# Patient Record
Sex: MALE | Race: BLACK OR AFRICAN AMERICAN | NOT HISPANIC OR LATINO | Employment: STUDENT | URBAN - METROPOLITAN AREA
[De-identification: names, ages, dates, MRNs, and addresses within clinical notes are randomized per-mention and may not be internally consistent; named-entity substitution may affect disease eponyms.]

---
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
## 2024-02-18 ENCOUNTER — AMB VIDEO VISIT (OUTPATIENT)
Dept: OTHER | Facility: HOSPITAL | Age: 24
End: 2024-02-18

## 2024-02-18 VITALS — RESPIRATION RATE: 16 BRPM

## 2024-02-18 DIAGNOSIS — F32.A DEPRESSION, UNSPECIFIED DEPRESSION TYPE: ICD-10-CM

## 2024-02-18 DIAGNOSIS — R41.840 LACK OF CONCENTRATION: Primary | ICD-10-CM

## 2024-02-18 PROCEDURE — ECARE PR SL URGENT CARE VIRTUAL VISIT: Performed by: NURSE PRACTITIONER

## 2024-02-19 ENCOUNTER — AMB VIDEO VISIT (OUTPATIENT)
Dept: OTHER | Facility: HOSPITAL | Age: 24
End: 2024-02-19

## 2024-02-19 NOTE — PROGRESS NOTES
Required Documentation:  Encounter provider MARILYN Roche    Provider located at [facility]  VIRTUAL CARE   [address] [id number]    Identify all parties in room with patient during virtual visit:  No one else    The patient was identified by name and date of birth. Seferino Mckenzie was informed that this is a telemedicine visit and that the visit is being conducted through the [employer] [facility] platform. He agrees to proceed..  My office door was closed. No one else was in the room.  He acknowledged consent and understanding of privacy and security of the video platform. The patient has agreed to participate and understands they can discontinue the visit at any time.    Verification of patient location:    Patient is located at Home in the following state in which I hold an active license [place]    Patient is aware this is a billable service.     Reason for visit is No chief complaint on file.       Subjective  This is a 23 year old male here today for video visit.  He states he has not been able to focusing as well as he normally would. He states he has seen a gradual change over the last year.   He has been deviated from his main task the needs to do.  He states he knows he has a deadline but will wait until the last minute.  He was able to do more things and able to remember thing.  He states he his having a hard time to focus.  He has been trying to to deadlines but he can not meet them. He states he is feeling a little down right now.  He states he just got out of a relations.  Decreased appetite.  He states he lost 20 pounds in December but then gained the weight back.  He denies any thoughts of self harm or harming anyone else.  He states he has been using melatonin gummies to help with sleep.   He is getting about 5 hours a sleep.  He is going to school at [employer].   He finds himself daydreaming.  He states he did  have some depression when he was in high school.  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    No past surgical history on file.     No Known Allergies    Review of Systems   Constitutional:  Negative for activity change, chills and fatigue.   Respiratory: Negative.     Cardiovascular: Negative.    Neurological: Negative.    Hematological: Negative.    Psychiatric/Behavioral:  Positive for decreased concentration and dysphoric mood. Negative for confusion, self-injury and suicidal ideas. The patient is not nervous/anxious.        Video Exam    Vitals:    02/18/24 2208   Resp: 16       Physical Exam  Constitutional:       General: He is not in acute distress.     Appearance: Normal appearance. He is not ill-appearing or toxic-appearing.   Neurological:      Mental Status: He is alert.   Psychiatric:         Attention and Perception: Attention and perception normal. He is attentive.         Mood and Affect: Affect is flat. Affect is not blunt or tearful.         Behavior: Behavior is slowed. Behavior is cooperative.         Thought Content: Thought content is not paranoid or delusional. Thought content does not include homicidal or suicidal ideation. Thought content does not include homicidal or suicidal plan.         Judgment: Judgment is not impulsive.         Visit Time  Total Visit Duration: 20 minutes    Assessment/Plan:    Diagnoses and all orders for this visit:    Lack of concentration  -     Ambulatory referral to Psych Services; Future    Depression, unspecified depression type  -     Ambulatory referral to Psych Services; Future        Patient Instructions   As we discussed you should see a mental health professional to figure out what is causing your symptoms.  I placed a referral through [facility].  You may also want to go to your school health center and see what resources are available to your.  If you develop any thoughts of self harm or  harming anyone else go directly to ER.

## 2024-02-19 NOTE — CARE ANYWHERE EVISITS
Visit Summary for Seferino Mckenzie - Gender: Male - Date of Birth: 2000  Date: [id number] - Duration: 20 minutes  Patient: Seferino Mckenzie  Provider: Marcela SANDERS    Patient Contact Information  Address  [address] [place] [address]; [place] [address]  [phone]    Visit Topics  I had a noticeable decrease in my memory and focus in the past year [Added By: Self - 2024-02-19]    Triage Questions   What is your current physical address in the event of a medical emergency? Answer []  Are you allergic to any medications? Answer []  Are you now or could you be pregnant? Answer []  Do you have any immune system compromise or chronic lung   disease? Answer []  Do you have any vulnerable family members in the home (infant, pregnant, cancer, elderly)? Answer []     Conversation Transcripts  [0A][0A] [Notification] You are connected with Marcela SANDERS, Urgent Care Specialist.[0A][Notification] Seferino Mckenzie is located in [place].[0A][Notification] Seferino Mckenzie has shared health history...[0A]    Diagnosis    Procedures  Value: 14418 Code: CPT-4 UNLISTED E&M SERVICE    Medications Prescribed    No prescriptions ordered    Electronically signed by: Marcela Shafer(NPI [id number])

## 2024-02-19 NOTE — PATIENT INSTRUCTIONS
As we discussed you should see a mental health professional to figure out what is causing your symptoms.  [facility].  You may also want to go to your school health center and see what resources are available to your.  If you develop any thoughts of self harm or harming anyone else go directly to ER.

## 2024-03-05 ENCOUNTER — TELEPHONE (OUTPATIENT)
Dept: PSYCHIATRY | Facility: CLINIC | Age: 24
End: 2024-03-05

## 2024-06-02 ENCOUNTER — APPOINTMENT (EMERGENCY)
Dept: RADIOLOGY | Facility: HOSPITAL | Age: 24
End: 2024-06-02
Payer: COMMERCIAL

## 2024-06-02 ENCOUNTER — HOSPITAL ENCOUNTER (EMERGENCY)
Facility: HOSPITAL | Age: 24
Discharge: HOME | End: 2024-06-02
Attending: EMERGENCY MEDICINE | Admitting: EMERGENCY MEDICINE
Payer: COMMERCIAL

## 2024-06-02 VITALS
RESPIRATION RATE: 16 BRPM | WEIGHT: 155 LBS | OXYGEN SATURATION: 99 % | HEART RATE: 95 BPM | BODY MASS INDEX: 22.19 KG/M2 | HEIGHT: 70 IN | DIASTOLIC BLOOD PRESSURE: 84 MMHG | SYSTOLIC BLOOD PRESSURE: 133 MMHG | TEMPERATURE: 99.6 F

## 2024-06-02 DIAGNOSIS — R10.9 ABDOMINAL PAIN, UNSPECIFIED ABDOMINAL LOCATION: Primary | ICD-10-CM

## 2024-06-02 DIAGNOSIS — R19.7 DIARRHEA, UNSPECIFIED TYPE: ICD-10-CM

## 2024-06-02 LAB
ALBUMIN SERPL-MCNC: 4.6 G/DL (ref 3.5–5.7)
ALP SERPL-CCNC: 76 IU/L (ref 34–125)
ALT SERPL-CCNC: 29 IU/L (ref 7–52)
ANION GAP SERPL CALC-SCNC: 7 MEQ/L (ref 3–15)
ANISOCYTOSIS BLD QL SMEAR: ABNORMAL
AST SERPL-CCNC: 30 IU/L (ref 13–39)
BACTERIA URNS QL MICRO: ABNORMAL /HPF
BASOPHILS # BLD: 0.06 K/UL (ref 0.01–0.1)
BASOPHILS NFR BLD: 1 %
BILIRUB SERPL-MCNC: 0.6 MG/DL (ref 0.3–1.2)
BILIRUB UR QL STRIP.AUTO: NEGATIVE MG/DL
BUN SERPL-MCNC: 15 MG/DL (ref 7–25)
CALCIUM SERPL-MCNC: 9.5 MG/DL (ref 8.6–10.3)
CHLORIDE SERPL-SCNC: 100 MEQ/L (ref 98–107)
CLARITY UR REFRACT.AUTO: CLEAR
CO2 SERPL-SCNC: 28 MEQ/L (ref 21–31)
COLOR UR AUTO: YELLOW
CREAT SERPL-MCNC: 1.1 MG/DL (ref 0.7–1.3)
DIFFERENTIAL METHOD BLD: ABNORMAL
EGFRCR SERPLBLD CKD-EPI 2021: >60 ML/MIN/1.73M*2
EOSINOPHIL # BLD: 0 K/UL (ref 0.04–0.54)
EOSINOPHIL NFR BLD: 0 %
ERYTHROCYTE [DISTWIDTH] IN BLOOD BY AUTOMATED COUNT: 11.9 % (ref 11.6–14.4)
FLUAV RNA SPEC QL NAA+PROBE: NEGATIVE
FLUBV RNA SPEC QL NAA+PROBE: NEGATIVE
GLUCOSE SERPL-MCNC: 107 MG/DL (ref 70–99)
GLUCOSE UR STRIP.AUTO-MCNC: NEGATIVE MG/DL
HCT VFR BLD AUTO: 43.8 % (ref 40.1–51)
HGB BLD-MCNC: 15 G/DL (ref 13.7–17.5)
HGB UR QL STRIP.AUTO: NEGATIVE
HYALINE CASTS #/AREA URNS LPF: ABNORMAL /LPF
KETONES UR STRIP.AUTO-MCNC: ABNORMAL MG/DL
LEUKOCYTE ESTERASE UR QL STRIP.AUTO: NEGATIVE
LYMPHOCYTES # BLD: 1.2 K/UL (ref 1.2–3.5)
LYMPHOCYTES NFR BLD: 19 %
MCH RBC QN AUTO: 28.4 PG (ref 28–33.2)
MCHC RBC AUTO-ENTMCNC: 34.2 G/DL (ref 32.2–36.5)
MCV RBC AUTO: 83 FL (ref 83–98)
METAMYELOCYTES # BLD MANUAL: 0.06 K/UL
METAMYELOCYTES NFR BLD MANUAL: 1 %
MONOCYTES # BLD: 0.38 K/UL (ref 0.3–1)
MONOCYTES NFR BLD: 6 %
MUCOUS THREADS URNS QL MICRO: 1 /LPF
NEUTS BAND # BLD: 0.31 K/UL (ref 0–0.53)
NEUTS BAND # BLD: 4.21 K/UL (ref 1.7–7)
NEUTS BAND NFR BLD: 5 %
NEUTS SEG NFR BLD: 67 %
NITRITE UR QL STRIP.AUTO: NEGATIVE
OVALOCYTES BLD QL SMEAR: ABNORMAL
PDW BLD AUTO: 10.2 FL (ref 9.4–12.4)
PH UR STRIP.AUTO: 6 [PH]
PLATELET # BLD AUTO: 177 K/UL (ref 150–350)
PLATELET # BLD EST: ABNORMAL 10*3/UL
PLATELET CLUMP BLD QL SMEAR: PRESENT
POTASSIUM SERPL-SCNC: 4 MEQ/L (ref 3.5–5.1)
PROT SERPL-MCNC: 8.1 G/DL (ref 6–8.2)
PROT UR QL STRIP.AUTO: 1
RBC # BLD AUTO: 5.28 M/UL (ref 4.5–5.8)
RBC #/AREA URNS HPF: ABNORMAL /HPF
RSV RNA SPEC QL NAA+PROBE: NEGATIVE
SARS-COV-2 RNA RESP QL NAA+PROBE: NEGATIVE
SCHISTOCYTES BLD QL SMEAR: ABNORMAL
SODIUM SERPL-SCNC: 135 MEQ/L (ref 136–145)
SP GR UR REFRACT.AUTO: >1.035
SQUAMOUS URNS QL MICRO: ABNORMAL /HPF
UROBILINOGEN UR STRIP-ACNC: 0.2 EU/DL
VARIANT LYMPHS # BLD MANUAL: 0.06 K/UL
VARIANT LYMPHS NFR BLD: 1 %
WBC # BLD AUTO: 6.29 K/UL (ref 3.8–10.5)
WBC #/AREA URNS HPF: ABNORMAL /HPF

## 2024-06-02 PROCEDURE — 81003 URINALYSIS AUTO W/O SCOPE: CPT

## 2024-06-02 PROCEDURE — 96361 HYDRATE IV INFUSION ADD-ON: CPT

## 2024-06-02 PROCEDURE — 25800000 HC PHARMACY IV SOLUTIONS: Performed by: PHYSICIAN ASSISTANT

## 2024-06-02 PROCEDURE — 85025 COMPLETE CBC W/AUTO DIFF WBC: CPT | Performed by: EMERGENCY MEDICINE

## 2024-06-02 PROCEDURE — 3E033GC INTRODUCTION OF OTHER THERAPEUTIC SUBSTANCE INTO PERIPHERAL VEIN, PERCUTANEOUS APPROACH: ICD-10-PCS | Performed by: EMERGENCY MEDICINE

## 2024-06-02 PROCEDURE — 63600000 HC DRUGS/DETAIL CODE: Mod: JZ | Performed by: PHYSICIAN ASSISTANT

## 2024-06-02 PROCEDURE — 74177 CT ABD & PELVIS W/CONTRAST: CPT

## 2024-06-02 PROCEDURE — 63600105 HC IODINE BASED CONTRAST: Mod: JZ | Performed by: PHYSICIAN ASSISTANT

## 2024-06-02 PROCEDURE — 3E023GC INTRODUCTION OF OTHER THERAPEUTIC SUBSTANCE INTO MUSCLE, PERCUTANEOUS APPROACH: ICD-10-PCS | Performed by: EMERGENCY MEDICINE

## 2024-06-02 PROCEDURE — 96372 THER/PROPH/DIAG INJ SC/IM: CPT | Mod: 59

## 2024-06-02 PROCEDURE — 99284 EMERGENCY DEPT VISIT MOD MDM: CPT | Mod: 25

## 2024-06-02 PROCEDURE — 81001 URINALYSIS AUTO W/SCOPE: CPT | Performed by: EMERGENCY MEDICINE

## 2024-06-02 PROCEDURE — 80053 COMPREHEN METABOLIC PANEL: CPT | Performed by: EMERGENCY MEDICINE

## 2024-06-02 PROCEDURE — 96374 THER/PROPH/DIAG INJ IV PUSH: CPT | Mod: 59

## 2024-06-02 PROCEDURE — 36415 COLL VENOUS BLD VENIPUNCTURE: CPT

## 2024-06-02 PROCEDURE — 3E0337Z INTRODUCTION OF ELECTROLYTIC AND WATER BALANCE SUBSTANCE INTO PERIPHERAL VEIN, PERCUTANEOUS APPROACH: ICD-10-PCS | Performed by: EMERGENCY MEDICINE

## 2024-06-02 PROCEDURE — 87637 SARSCOV2&INF A&B&RSV AMP PRB: CPT | Performed by: EMERGENCY MEDICINE

## 2024-06-02 RX ORDER — IOPAMIDOL 755 MG/ML
100 INJECTION, SOLUTION INTRAVASCULAR
Status: COMPLETED | OUTPATIENT
Start: 2024-06-02 | End: 2024-06-02

## 2024-06-02 RX ORDER — DICYCLOMINE HYDROCHLORIDE 10 MG/ML
20 INJECTION INTRAMUSCULAR ONCE
Status: COMPLETED | OUTPATIENT
Start: 2024-06-02 | End: 2024-06-02

## 2024-06-02 RX ORDER — ONDANSETRON HYDROCHLORIDE 2 MG/ML
4 INJECTION, SOLUTION INTRAVENOUS ONCE
Status: COMPLETED | OUTPATIENT
Start: 2024-06-02 | End: 2024-06-02

## 2024-06-02 RX ADMIN — SODIUM CHLORIDE 1000 ML: 9 INJECTION, SOLUTION INTRAVENOUS at 10:31

## 2024-06-02 RX ADMIN — ONDANSETRON 4 MG: 2 INJECTION INTRAMUSCULAR; INTRAVENOUS at 10:33

## 2024-06-02 RX ADMIN — DICYCLOMINE HYDROCHLORIDE 20 MG: 10 INJECTION, SOLUTION INTRAMUSCULAR at 10:33

## 2024-06-02 RX ADMIN — IOPAMIDOL 100 ML: 755 INJECTION, SOLUTION INTRAVENOUS at 12:00

## 2024-06-02 ASSESSMENT — ENCOUNTER SYMPTOMS
NUMBNESS: 1
FEVER: 0
HEMATURIA: 0
COUGH: 0
DIFFICULTY URINATING: 0
DIARRHEA: 1
DYSURIA: 0
VOMITING: 0
DIZZINESS: 0
CONSTIPATION: 1
CHILLS: 1
ABDOMINAL PAIN: 1
NAUSEA: 0
FREQUENCY: 0
SHORTNESS OF BREATH: 0
WOUND: 0
FLANK PAIN: 0
BACK PAIN: 0

## 2024-06-02 NOTE — ED PROVIDER NOTES
"Emergency Medicine Note  HPI   HISTORY OF PRESENT ILLNESS         22 y/o male presents for evaluation of abdominal pain and \"bowel inconsistencies\" x 2 weeks. Patient was in [facility] when symptoms started. States while traveling, he developed LUQ and LLQ pain described as cramping as well as 1-2 episodes of diarrhea per day. States he had 3 days of constipation from 5/20-5/22 but symptoms then returned. States over past couple days, he has chills, tingling of both legs, and a headache. Did not take anything for symptomatic relief. No known sick contacts. No fevers, chills, CP, SOB, vomiting, urinary complaints, bloody stools, dizziness, or syncope.          Patient History   PAST HISTORY     Reviewed from Nursing Triage:       History reviewed. No pertinent past medical history.    History reviewed. No pertinent surgical history.    Family History   Problem Relation Age of Onset    Diabetes Biological Father     No Known Problems Biological Mother     Hypertension Maternal Grandmother     Hypertension Maternal Grandfather     Dementia Paternal Grandfather        Social History     Tobacco Use    Smoking status: Never    Smokeless tobacco: Never   Substance Use Topics    Alcohol use: Never    Drug use: Never         Review of Systems   REVIEW OF SYSTEMS     Review of Systems   Constitutional:  Positive for chills. Negative for fever.   Respiratory:  Negative for cough and shortness of breath.    Cardiovascular:  Negative for chest pain.   Gastrointestinal:  Positive for abdominal pain, constipation and diarrhea. Negative for nausea and vomiting.   Genitourinary:  Negative for difficulty urinating, dysuria, flank pain, frequency and hematuria.   Musculoskeletal:  Negative for back pain.   Skin:  Negative for rash and wound.   Neurological:  Positive for numbness. Negative for dizziness and syncope.         VITALS     ED Vitals      Date/Time Temp Pulse Resp BP SpO2 [facility]   06/02/24 0941 37.6 °C (99.6 °F) 95 16 133/84 " 99 % JRT          Pulse Ox %: 99 % (06/02/24 1114)  Pulse Ox Interpretation: Normal (06/02/24 1114)           Physical Exam   PHYSICAL EXAM     Physical Exam  Vitals and nursing note reviewed.   Constitutional:       Appearance: He is well-developed.   HENT:      Head: Normocephalic.      Nose: Nose normal.   Eyes:      Conjunctiva/sclera: Conjunctivae normal.   Cardiovascular:      Rate and Rhythm: Normal rate and regular rhythm.   Pulmonary:      Effort: Pulmonary effort is normal.      Breath sounds: Normal breath sounds.   Abdominal:      General: Bowel sounds are normal.      Palpations: Abdomen is soft.      Tenderness: There is no guarding or rebound.      Comments: LLQ and suprapubic tenderness   Musculoskeletal:      Cervical back: Neck supple.   Skin:     General: Skin is warm and dry.   Neurological:      General: No focal deficit present.      Mental Status: He is alert.   Psychiatric:         Mood and Affect: Mood normal.           PROCEDURES     Procedures     DATA     Results       Procedure Component Value Units Date/Time    CBC and differential [[id number]]  (Abnormal) Collected: 06/02/24 0952    Specimen: Blood, Venous Updated: 06/02/24 1118     WBC 6.29 K/uL      RBC 5.28 M/uL      Hemoglobin 15.0 g/dL      Hematocrit 43.8 %      MCV 83.0 fL      MCH 28.4 pg      MCHC 34.2 g/dL      RDW 11.9 %      Platelets 177 K/uL      MPV 10.2 fL      Differential Type Manu     Neutrophils 67 %      Lymphocytes 19 %      Monocytes 6 %      Eosinophils 0 %      Basophils 1 %      Bands 5 %      Metamyelocytes 1 %      Atypical Lymphocytes 1 %      Neutrophils, Absolute 4.21 K/uL      Lymphocytes, Absolute 1.20 K/uL      Monocytes, Absolute 0.38 K/uL      Eosinophils, Absolute 0.00 K/uL      Basophils, Absolute 0.06 K/uL      Bands, Absolute 0.31 K/uL      Metamyelocytes, Absolute 0.06 K/uL      Atypical Lymphs, Absolute 0.06 K/uL      Platelet Estimate Adequate (150,000-400,000)     Clumped Platelets Present      Anisocytosis 1+     Ovalocytes Occasional     Schistocytes Occasional    SARS-COV-2 (COVID-19)/ FLU A/B, AND RSV, PCR Nasopharynx [[id number]]  (Normal) Collected: 06/02/24 0946    Specimen: Nasopharyngeal Swab from Nasopharynx Updated: 06/02/24 1050     SARS-CoV-2 (COVID-19) Negative     Influenza A Negative     Influenza B Negative     Respiratory Syncytial Virus Negative    Narrative:      Testing performed using real-time PCR for detection of COVID-19. EUA approved validation studies performed on site.     Comprehensive metabolic panel [[id number]]  (Abnormal) Collected: 06/02/24 0952    Specimen: Blood, Venous Updated: 06/02/24 1027     Sodium 135 mEQ/L      Potassium 4.0 mEQ/L      Comment: Results obtained on plasma. Plasma Potassium values may be up to 0.4 mEQ/L less than serum values. The differences may be greater for patients with high platelet or white cell counts.        Chloride 100 mEQ/L      CO2 28 mEQ/L      BUN 15 mg/dL      Creatinine 1.1 mg/dL      Glucose 107 mg/dL      Calcium 9.5 mg/dL      AST (SGOT) 30 IU/L      ALT (SGPT) 29 IU/L      Alkaline Phosphatase 76 IU/L      Total Protein 8.1 g/dL      Comment: Test performed on plasma which typically contains approximately 0.4 g/dL more protein than serum.        Albumin 4.6 g/dL      Bilirubin, Total 0.6 mg/dL      eGFR >60.0 mL/min/1.73m*2      Comment: Calculation based on the Chronic Kidney Disease Epidemiology Collaboration (CKD-EPI) equation refit without adjustment for race.        Anion Gap 7 mEQ/L     Urinalysis with Reflex Culture [[id number]]  (Abnormal) Collected: 06/02/24 0952    Specimen: Urine, Clean Catch Updated: 06/02/24 1016    Narrative:      The following orders were created for panel order Urinalysis with Reflex Culture.  Procedure                               Abnormality         Status                     ---------                               -----------         ------                     UA Reflex to Culture (Mac...[[id number]]   Abnormal            Final result               UA Microscopic[[id number]]                Abnormal            Final result                 Please view results for these tests on the individual orders.    UA Microscopic [[id number]]  (Abnormal) Collected: 06/02/24 0952    Specimen: Urine, Clean Catch Updated: 06/02/24 1016     RBC, Urine 0 TO 4 /HPF      WBC, Urine 0 TO 3 /HPF      Squamous Epithelial Rare /hpf      Hyaline Cast None Seen /lpf      Bacteria, Urine None Seen /HPF      Mucus +1 /LPF     UA Reflex to Culture (Macroscopic) [[id number]]  (Abnormal) Collected: 06/02/24 0952    Specimen: Urine, Clean Catch Updated: 06/02/24 1015     Color, Urine Yellow     Clarity, Urine Clear     Specific Gravity, Urine >1.035     pH, Urine 6.0     Leukocyte Esterase Negative     Comment: Results can be falsely negative due to high specific gravity, some antibiotics, glucose >3 g/dl, or WBC other than neutrophils.        Nitrite, Urine Negative     Protein, Urine +1     Glucose, Urine Negative mg/dL      Ketones, Urine Trace mg/dL      Comment: Free sulfhydryl drugs such as Mesna, Capoten, and Acetylcysteine (Mucomyst) may cause false positive ketonuria.        Urobilinogen, Urine 0.2 EU/dL      Bilirubin, Urine Negative mg/dL      Blood, Urine Negative     Comment: The sensitivity of the occult blood test is equivalent to approximately 4 intact RBC/HPF.               Imaging Results              CT ABDOMEN PELVIS WITH IV CONTRAST (Final result)  Result time 06/02/24 12:32:13      Final result                   Impression:    IMPRESSION: No acute abdominal or pelvic pathology. Normal appendix.    STUDY: Axial CT images of the abdomen and pelvis were obtained from the lung  bases to the pubic symphysis. 100 cc of Isovue-370 IV contrast were given.  Images were reviewed in soft tissue, lung and bone windows.    CT DOSE:  One or more dose reduction techniques (e.g. automated exposure  control, adjustment of the mA and/or kV  according to the patient size, use of  iterative reconstruction technique) utilized for this examination.    COMMENT:    GI System:  Liver:  Normal in size and appearance. Hepatic veins and portal veins are  patent.  Gallbladder and bile ducts:  Normal.  Pancreas:  Normal.  Stomach:  Normal in size and appearance.  Small bowel:  Normal in caliber and appearance.  Large bowel:  Normal  in caliber and appearance. Normal appendix.     System:  Adrenals:  Normal.  Kidneys:  Normal in position and sizewith normal nephrograms, no  hydronephrosis.  Urinary bladder:  Normal.  Prostate gland and seminal vesicles:  Normal in appearance.    RES System:  Spleen:  Normal in size and appearance.  Lymph nodes:  There are several prominent mesenteric lymph nodes, the  largest axial image #64 measuring 8 x 17 mm.    Other:  Peritoneum:  No free air or fluid, contained fluid collection, or mass.  Aorta and iliac arteries:  No atherosclerotic change.  Abdominal wall: Normal.  Lower lungs and pleura:  Clear bilaterally.  Visualized axial skeleton:  Intact.               Narrative:    CLINICAL HISTORY: Abdominal pain. Diarrhea.    COMPARISON: None.                                      No orders to display       Scoring tools                                  ED Course & MDM   MDM / ED COURSE / CLINICAL IMPRESSION / DISPO     Medical Decision Making  Problems Addressed:  Abdominal pain, unspecified abdominal location: acute illness or injury  Diarrhea, unspecified type: acute illness or injury    Amount and/or Complexity of Data Reviewed  External Data Reviewed: notes.  Labs: ordered. Decision-making details documented in ED Course.  Radiology: ordered and independent interpretation performed. Decision-making details documented in ED Course.    Risk  Prescription drug management.        ED Course as of 06/02/24 1256   Sun Jun 02, 2024   1043 Normal VS. Well appearing. Labs initiated in triage. Plan for CT. Will give IV fluids, Bentyl,  Zofran. He will give stool sample if able. DDx colitis, diverticulitis. [TT]   1114 Labs unremarkable [TT]   1244 CT with prominent mesenteric lymph nodes, otherwise no significant findings. [TT]   1254 Resting comfortably. Unable to give stool sample. Plan for bland diet, close f/u with PCP, f/u with GI if symptoms persist. Return precautions given. [TT]      ED Course User Index  [TT] Yadi Neely PA C     Clinical Impression      Abdominal pain, unspecified abdominal location   Diarrhea, unspecified type     _________________       ED Disposition   Discharge                       Yadi Neely PA C  06/02/24 1254

## 2024-06-02 NOTE — DISCHARGE INSTRUCTIONS
Do a bland diet  Hydrate and rest  See GI if symptoms persist  Avoid anti-diarrhea medicine such as Immodium  Return to the ER for worsening pain, vomiting, bloody stools, fevers, pass out, or any other concerning symptoms

## 2024-06-07 NOTE — ED ATTESTATION NOTE
The patient was evaluated and managed by the physician assistant / nurse practitioner.     Ann Kramer,   06/07/24 1200